# Patient Record
Sex: MALE | Race: WHITE | NOT HISPANIC OR LATINO | Employment: OTHER | URBAN - NONMETROPOLITAN AREA
[De-identification: names, ages, dates, MRNs, and addresses within clinical notes are randomized per-mention and may not be internally consistent; named-entity substitution may affect disease eponyms.]

---

## 2017-02-10 PROBLEM — G47.33 OBSTRUCTIVE SLEEP APNEA SYNDROME: Chronic | Status: ACTIVE | Noted: 2017-02-10

## 2017-02-10 PROBLEM — E78.5 DYSLIPIDEMIA: Chronic | Status: ACTIVE | Noted: 2017-02-10

## 2017-02-10 PROBLEM — D75.1 POLYCYTHEMIA, SECONDARY: Chronic | Status: ACTIVE | Noted: 2017-02-10

## 2017-02-10 PROBLEM — Z86.39 H/O HYPOTESTOSTERONEMIA: Chronic | Status: ACTIVE | Noted: 2017-02-10

## 2020-04-17 ENCOUNTER — HISTORICAL (OUTPATIENT)
Dept: ADMINISTRATIVE | Facility: HOSPITAL | Age: 55
End: 2020-04-17

## 2020-09-24 ENCOUNTER — LAB VISIT (OUTPATIENT)
Dept: LAB | Facility: HOSPITAL | Age: 55
End: 2020-09-24
Attending: PAIN MEDICINE
Payer: MEDICARE

## 2020-09-24 DIAGNOSIS — Z79.899 ENCOUNTER FOR LONG-TERM (CURRENT) USE OF OTHER MEDICATIONS: Primary | ICD-10-CM

## 2020-09-24 LAB
ALBUMIN SERPL BCP-MCNC: 3.7 G/DL (ref 3.5–5.2)
ALP SERPL-CCNC: 148 U/L (ref 55–135)
ALT SERPL W/O P-5'-P-CCNC: 17 U/L (ref 10–44)
ANION GAP SERPL CALC-SCNC: 5 MMOL/L (ref 8–16)
AST SERPL-CCNC: 15 U/L (ref 10–40)
BASOPHILS # BLD AUTO: 0.09 K/UL (ref 0–0.2)
BASOPHILS NFR BLD: 1.1 % (ref 0–1.9)
BILIRUB SERPL-MCNC: 0.4 MG/DL (ref 0.1–1)
BUN SERPL-MCNC: 10 MG/DL (ref 6–20)
CALCIUM SERPL-MCNC: 9.1 MG/DL (ref 8.7–10.5)
CHLORIDE SERPL-SCNC: 106 MMOL/L (ref 95–110)
CO2 SERPL-SCNC: 29 MMOL/L (ref 23–29)
CREAT SERPL-MCNC: 1 MG/DL (ref 0.5–1.4)
CRP SERPL-MCNC: 0.96 MG/DL (ref 0–0.75)
DIFFERENTIAL METHOD: ABNORMAL
EOSINOPHIL # BLD AUTO: 0.2 K/UL (ref 0–0.5)
EOSINOPHIL NFR BLD: 2.4 % (ref 0–8)
ERYTHROCYTE [DISTWIDTH] IN BLOOD BY AUTOMATED COUNT: 14.8 % (ref 11.5–14.5)
ERYTHROCYTE [SEDIMENTATION RATE] IN BLOOD: 0 MM/HR (ref 0–10)
EST. GFR  (AFRICAN AMERICAN): >60 ML/MIN/1.73 M^2
EST. GFR  (NON AFRICAN AMERICAN): >60 ML/MIN/1.73 M^2
GLUCOSE SERPL-MCNC: 94 MG/DL (ref 70–110)
HCT VFR BLD AUTO: 52.2 % (ref 40–54)
HGB BLD-MCNC: 16.8 G/DL (ref 14–18)
IMM GRANULOCYTES # BLD AUTO: 0.03 K/UL (ref 0–0.04)
IMM GRANULOCYTES NFR BLD AUTO: 0.4 % (ref 0–0.5)
LYMPHOCYTES # BLD AUTO: 1.6 K/UL (ref 1–4.8)
LYMPHOCYTES NFR BLD: 19.3 % (ref 18–48)
MCH RBC QN AUTO: 27.5 PG (ref 27–31)
MCHC RBC AUTO-ENTMCNC: 32.2 G/DL (ref 32–36)
MCV RBC AUTO: 85 FL (ref 82–98)
MONOCYTES # BLD AUTO: 0.7 K/UL (ref 0.3–1)
MONOCYTES NFR BLD: 8.4 % (ref 4–15)
NEUTROPHILS # BLD AUTO: 5.6 K/UL (ref 1.8–7.7)
NEUTROPHILS NFR BLD: 68.4 % (ref 38–73)
NRBC BLD-RTO: 0 /100 WBC
PLATELET # BLD AUTO: 238 K/UL (ref 150–350)
PMV BLD AUTO: 10.3 FL (ref 9.2–12.9)
POTASSIUM SERPL-SCNC: 3.3 MMOL/L (ref 3.5–5.1)
PROT SERPL-MCNC: 7.7 G/DL (ref 6–8.4)
RBC # BLD AUTO: 6.11 M/UL (ref 4.6–6.2)
SODIUM SERPL-SCNC: 140 MMOL/L (ref 136–145)
WBC # BLD AUTO: 8.22 K/UL (ref 3.9–12.7)

## 2020-09-24 PROCEDURE — 80053 COMPREHEN METABOLIC PANEL: CPT

## 2020-09-24 PROCEDURE — 85025 COMPLETE CBC W/AUTO DIFF WBC: CPT

## 2020-09-24 PROCEDURE — 36415 COLL VENOUS BLD VENIPUNCTURE: CPT

## 2020-09-24 PROCEDURE — 85651 RBC SED RATE NONAUTOMATED: CPT

## 2020-09-24 PROCEDURE — 86140 C-REACTIVE PROTEIN: CPT

## 2020-10-30 ENCOUNTER — LAB VISIT (OUTPATIENT)
Dept: LAB | Facility: HOSPITAL | Age: 55
End: 2020-10-30
Attending: PAIN MEDICINE
Payer: MEDICARE

## 2020-10-30 DIAGNOSIS — R79.9 ABNORMAL BLOOD CHEMISTRY: Primary | ICD-10-CM

## 2020-10-30 LAB
ALP SERPL-CCNC: 147 U/L (ref 55–135)
CRP SERPL-MCNC: 0.44 MG/DL (ref 0–0.75)
POTASSIUM SERPL-SCNC: 4.7 MMOL/L (ref 3.5–5.1)

## 2020-10-30 PROCEDURE — 36415 COLL VENOUS BLD VENIPUNCTURE: CPT

## 2020-10-30 PROCEDURE — 84132 ASSAY OF SERUM POTASSIUM: CPT

## 2020-10-30 PROCEDURE — 84075 ASSAY ALKALINE PHOSPHATASE: CPT

## 2020-10-30 PROCEDURE — 86140 C-REACTIVE PROTEIN: CPT

## 2020-11-30 ENCOUNTER — LAB VISIT (OUTPATIENT)
Dept: LAB | Facility: HOSPITAL | Age: 55
End: 2020-11-30
Attending: INTERNAL MEDICINE
Payer: MEDICARE

## 2020-11-30 DIAGNOSIS — E87.6 HYPOKALEMIA: Primary | ICD-10-CM

## 2020-11-30 LAB
ANION GAP SERPL CALC-SCNC: 9 MMOL/L (ref 8–16)
BUN SERPL-MCNC: 10 MG/DL (ref 6–20)
CALCIUM SERPL-MCNC: 9.3 MG/DL (ref 8.7–10.5)
CHLORIDE SERPL-SCNC: 104 MMOL/L (ref 95–110)
CO2 SERPL-SCNC: 30 MMOL/L (ref 23–29)
CREAT SERPL-MCNC: 1.3 MG/DL (ref 0.5–1.4)
EST. GFR  (AFRICAN AMERICAN): >60 ML/MIN/1.73 M^2
EST. GFR  (NON AFRICAN AMERICAN): >60 ML/MIN/1.73 M^2
GLUCOSE SERPL-MCNC: 114 MG/DL (ref 70–110)
POTASSIUM SERPL-SCNC: 4 MMOL/L (ref 3.5–5.1)
SODIUM SERPL-SCNC: 143 MMOL/L (ref 136–145)

## 2020-11-30 PROCEDURE — 80048 BASIC METABOLIC PNL TOTAL CA: CPT

## 2020-11-30 PROCEDURE — 36415 COLL VENOUS BLD VENIPUNCTURE: CPT

## 2020-12-08 ENCOUNTER — HOSPITAL ENCOUNTER (OUTPATIENT)
Dept: RADIOLOGY | Facility: HOSPITAL | Age: 55
Discharge: HOME | End: 2020-12-08
Attending: INTERNAL MEDICINE
Payer: MEDICARE

## 2020-12-08 DIAGNOSIS — R10.9 ABDOMINAL PAIN: ICD-10-CM

## 2020-12-08 DIAGNOSIS — R10.9 ABDOMINAL PAIN: Primary | ICD-10-CM

## 2020-12-08 PROCEDURE — 74018 RADEX ABDOMEN 1 VIEW: CPT | Mod: TC

## 2021-03-25 ENCOUNTER — IMMUNIZATION (OUTPATIENT)
Dept: OBSTETRICS AND GYNECOLOGY | Facility: CLINIC | Age: 56
End: 2021-03-25
Payer: MEDICARE

## 2021-03-25 DIAGNOSIS — Z23 NEED FOR VACCINATION: Primary | ICD-10-CM

## 2021-03-25 PROCEDURE — 0001A COVID-19, MRNA, LNP-S, PF, 30 MCG/0.3 ML DOSE VACCINE: ICD-10-PCS | Mod: CV19,,, | Performed by: ANESTHESIOLOGY

## 2021-03-25 PROCEDURE — 91300 COVID-19, MRNA, LNP-S, PF, 30 MCG/0.3 ML DOSE VACCINE: ICD-10-PCS | Mod: ,,, | Performed by: ANESTHESIOLOGY

## 2021-03-25 PROCEDURE — 0001A COVID-19, MRNA, LNP-S, PF, 30 MCG/0.3 ML DOSE VACCINE: CPT | Mod: CV19,,, | Performed by: ANESTHESIOLOGY

## 2021-03-25 PROCEDURE — 91300 COVID-19, MRNA, LNP-S, PF, 30 MCG/0.3 ML DOSE VACCINE: CPT | Mod: ,,, | Performed by: ANESTHESIOLOGY

## 2021-04-15 ENCOUNTER — IMMUNIZATION (OUTPATIENT)
Dept: OBSTETRICS AND GYNECOLOGY | Facility: CLINIC | Age: 56
End: 2021-04-15
Payer: MEDICARE

## 2021-04-15 DIAGNOSIS — Z23 NEED FOR VACCINATION: Primary | ICD-10-CM

## 2021-04-15 PROCEDURE — 0002A COVID-19, MRNA, LNP-S, PF, 30 MCG/0.3 ML DOSE VACCINE: ICD-10-PCS | Mod: CV19,,, | Performed by: ANESTHESIOLOGY

## 2021-04-15 PROCEDURE — 91300 COVID-19, MRNA, LNP-S, PF, 30 MCG/0.3 ML DOSE VACCINE: ICD-10-PCS | Mod: ,,, | Performed by: ANESTHESIOLOGY

## 2021-04-15 PROCEDURE — 91300 COVID-19, MRNA, LNP-S, PF, 30 MCG/0.3 ML DOSE VACCINE: CPT | Mod: ,,, | Performed by: ANESTHESIOLOGY

## 2021-04-15 PROCEDURE — 0002A COVID-19, MRNA, LNP-S, PF, 30 MCG/0.3 ML DOSE VACCINE: CPT | Mod: CV19,,, | Performed by: ANESTHESIOLOGY

## 2021-10-06 ENCOUNTER — LAB VISIT (OUTPATIENT)
Dept: LAB | Facility: HOSPITAL | Age: 56
End: 2021-10-06
Attending: INTERNAL MEDICINE
Payer: MEDICARE

## 2021-10-06 DIAGNOSIS — M19.90 OSTEOARTHRITIS: Primary | ICD-10-CM

## 2021-10-06 DIAGNOSIS — E78.5 HYPERLIPIDEMIA: ICD-10-CM

## 2021-10-06 DIAGNOSIS — M54.9 SEVERE BACK PAIN: ICD-10-CM

## 2021-10-06 LAB
ALBUMIN SERPL BCP-MCNC: 3.7 G/DL (ref 3.5–5.2)
ALP SERPL-CCNC: 142 U/L (ref 55–135)
ALT SERPL W/O P-5'-P-CCNC: 12 U/L (ref 10–44)
ANION GAP SERPL CALC-SCNC: 4 MMOL/L (ref 8–16)
AST SERPL-CCNC: 5 U/L (ref 10–40)
BASOPHILS # BLD AUTO: 0.1 K/UL (ref 0–0.2)
BASOPHILS NFR BLD: 1 % (ref 0–1.9)
BILIRUB SERPL-MCNC: 0.4 MG/DL (ref 0.1–1)
BUN SERPL-MCNC: 8 MG/DL (ref 6–20)
CALCIUM SERPL-MCNC: 9.1 MG/DL (ref 8.7–10.5)
CHLORIDE SERPL-SCNC: 105 MMOL/L (ref 95–110)
CHOLEST SERPL-MCNC: 98 MG/DL (ref 120–199)
CHOLEST/HDLC SERPL: 3.1 {RATIO} (ref 2–5)
CO2 SERPL-SCNC: 30 MMOL/L (ref 23–29)
COMPLEXED PSA SERPL-MCNC: 1.5 NG/ML (ref 0–4)
CREAT SERPL-MCNC: 1.2 MG/DL (ref 0.5–1.4)
DIFFERENTIAL METHOD: NORMAL
EOSINOPHIL # BLD AUTO: 0.2 K/UL (ref 0–0.5)
EOSINOPHIL NFR BLD: 2.3 % (ref 0–8)
ERYTHROCYTE [DISTWIDTH] IN BLOOD BY AUTOMATED COUNT: 14.4 % (ref 11.5–14.5)
EST. GFR  (AFRICAN AMERICAN): >60 ML/MIN/1.73 M^2
EST. GFR  (NON AFRICAN AMERICAN): >60 ML/MIN/1.73 M^2
ESTIMATED AVG GLUCOSE: 105 MG/DL (ref 68–131)
GLUCOSE SERPL-MCNC: 116 MG/DL (ref 70–110)
HBA1C MFR BLD: 5.3 % (ref 4–5.6)
HCT VFR BLD AUTO: 53.1 % (ref 40–54)
HDLC SERPL-MCNC: 32 MG/DL (ref 40–75)
HDLC SERPL: 32.7 % (ref 20–50)
HGB BLD-MCNC: 18 G/DL (ref 14–18)
IMM GRANULOCYTES # BLD AUTO: 0.03 K/UL (ref 0–0.04)
IMM GRANULOCYTES NFR BLD AUTO: 0.3 % (ref 0–0.5)
LDLC SERPL CALC-MCNC: 6.4 MG/DL (ref 63–159)
LYMPHOCYTES # BLD AUTO: 2.1 K/UL (ref 1–4.8)
LYMPHOCYTES NFR BLD: 22.3 % (ref 18–48)
MCH RBC QN AUTO: 30.3 PG (ref 27–31)
MCHC RBC AUTO-ENTMCNC: 33.9 G/DL (ref 32–36)
MCV RBC AUTO: 89 FL (ref 82–98)
MONOCYTES # BLD AUTO: 0.8 K/UL (ref 0.3–1)
MONOCYTES NFR BLD: 8.1 % (ref 4–15)
NEUTROPHILS # BLD AUTO: 6.3 K/UL (ref 1.8–7.7)
NEUTROPHILS NFR BLD: 66 % (ref 38–73)
NONHDLC SERPL-MCNC: 66 MG/DL
NRBC BLD-RTO: 0 /100 WBC
PLATELET # BLD AUTO: 265 K/UL (ref 150–450)
PMV BLD AUTO: 10.1 FL (ref 9.2–12.9)
POTASSIUM SERPL-SCNC: 3.4 MMOL/L (ref 3.5–5.1)
PROT SERPL-MCNC: 7.7 G/DL (ref 6–8.4)
RBC # BLD AUTO: 5.94 M/UL (ref 4.6–6.2)
SODIUM SERPL-SCNC: 139 MMOL/L (ref 136–145)
TRIGL SERPL-MCNC: 298 MG/DL (ref 30–150)
TSH SERPL DL<=0.005 MIU/L-ACNC: 3.78 UIU/ML (ref 0.4–4)
WBC # BLD AUTO: 9.61 K/UL (ref 3.9–12.7)

## 2021-10-06 PROCEDURE — 84153 ASSAY OF PSA TOTAL: CPT | Performed by: INTERNAL MEDICINE

## 2021-10-06 PROCEDURE — 36415 COLL VENOUS BLD VENIPUNCTURE: CPT | Performed by: INTERNAL MEDICINE

## 2021-10-06 PROCEDURE — 80061 LIPID PANEL: CPT | Performed by: INTERNAL MEDICINE

## 2021-10-06 PROCEDURE — 85025 COMPLETE CBC W/AUTO DIFF WBC: CPT | Performed by: INTERNAL MEDICINE

## 2021-10-06 PROCEDURE — 84443 ASSAY THYROID STIM HORMONE: CPT | Performed by: INTERNAL MEDICINE

## 2021-10-06 PROCEDURE — 83036 HEMOGLOBIN GLYCOSYLATED A1C: CPT | Performed by: INTERNAL MEDICINE

## 2021-10-06 PROCEDURE — 80053 COMPREHEN METABOLIC PANEL: CPT | Performed by: INTERNAL MEDICINE

## 2021-11-05 ENCOUNTER — LAB VISIT (OUTPATIENT)
Dept: LAB | Facility: HOSPITAL | Age: 56
End: 2021-11-05
Attending: INTERNAL MEDICINE
Payer: MEDICARE

## 2021-11-05 DIAGNOSIS — I10 ESSENTIAL HYPERTENSION, BENIGN: Primary | ICD-10-CM

## 2021-11-05 DIAGNOSIS — E78.5 DYSLIPIDEMIA: ICD-10-CM

## 2021-11-05 DIAGNOSIS — E03.9 HYPOTHYROIDISM, UNSPECIFIED TYPE: ICD-10-CM

## 2021-11-05 LAB
ALBUMIN SERPL BCP-MCNC: 4 G/DL (ref 3.5–5.2)
ALP SERPL-CCNC: 166 U/L (ref 55–135)
ALT SERPL W/O P-5'-P-CCNC: 23 U/L (ref 10–44)
ANION GAP SERPL CALC-SCNC: 7 MMOL/L (ref 8–16)
AST SERPL-CCNC: 12 U/L (ref 10–40)
BASOPHILS # BLD AUTO: 0.14 K/UL (ref 0–0.2)
BASOPHILS NFR BLD: 1.3 % (ref 0–1.9)
BILIRUB SERPL-MCNC: 0.9 MG/DL (ref 0.1–1)
BUN SERPL-MCNC: 10 MG/DL (ref 6–20)
CALCIUM SERPL-MCNC: 9.6 MG/DL (ref 8.7–10.5)
CHLORIDE SERPL-SCNC: 102 MMOL/L (ref 95–110)
CO2 SERPL-SCNC: 30 MMOL/L (ref 23–29)
CREAT SERPL-MCNC: 1.3 MG/DL (ref 0.5–1.4)
DIFFERENTIAL METHOD: ABNORMAL
EOSINOPHIL # BLD AUTO: 0.1 K/UL (ref 0–0.5)
EOSINOPHIL NFR BLD: 1.2 % (ref 0–8)
ERYTHROCYTE [DISTWIDTH] IN BLOOD BY AUTOMATED COUNT: 14.8 % (ref 11.5–14.5)
EST. GFR  (AFRICAN AMERICAN): >60 ML/MIN/1.73 M^2
EST. GFR  (NON AFRICAN AMERICAN): >60 ML/MIN/1.73 M^2
GLUCOSE SERPL-MCNC: 100 MG/DL (ref 70–110)
HCT VFR BLD AUTO: 58.3 % (ref 40–54)
HGB BLD-MCNC: 19.2 G/DL (ref 14–18)
IMM GRANULOCYTES # BLD AUTO: 0.06 K/UL (ref 0–0.04)
IMM GRANULOCYTES NFR BLD AUTO: 0.6 % (ref 0–0.5)
LYMPHOCYTES # BLD AUTO: 1.6 K/UL (ref 1–4.8)
LYMPHOCYTES NFR BLD: 15.4 % (ref 18–48)
MCH RBC QN AUTO: 30.1 PG (ref 27–31)
MCHC RBC AUTO-ENTMCNC: 32.9 G/DL (ref 32–36)
MCV RBC AUTO: 92 FL (ref 82–98)
MONOCYTES # BLD AUTO: 0.7 K/UL (ref 0.3–1)
MONOCYTES NFR BLD: 6.8 % (ref 4–15)
NEUTROPHILS # BLD AUTO: 8 K/UL (ref 1.8–7.7)
NEUTROPHILS NFR BLD: 74.7 % (ref 38–73)
NRBC BLD-RTO: 0 /100 WBC
PLATELET # BLD AUTO: 260 K/UL (ref 150–450)
PMV BLD AUTO: 9.5 FL (ref 9.2–12.9)
POTASSIUM SERPL-SCNC: 4.5 MMOL/L (ref 3.5–5.1)
PROT SERPL-MCNC: 8.6 G/DL (ref 6–8.4)
RBC # BLD AUTO: 6.37 M/UL (ref 4.6–6.2)
SODIUM SERPL-SCNC: 139 MMOL/L (ref 136–145)
TESTOST SERPL-MCNC: 277 NG/DL (ref 304–1227)
WBC # BLD AUTO: 10.64 K/UL (ref 3.9–12.7)

## 2021-11-05 PROCEDURE — 36415 COLL VENOUS BLD VENIPUNCTURE: CPT | Performed by: INTERNAL MEDICINE

## 2021-11-05 PROCEDURE — 80053 COMPREHEN METABOLIC PANEL: CPT | Performed by: INTERNAL MEDICINE

## 2021-11-05 PROCEDURE — 84403 ASSAY OF TOTAL TESTOSTERONE: CPT | Performed by: INTERNAL MEDICINE

## 2021-11-05 PROCEDURE — 85025 COMPLETE CBC W/AUTO DIFF WBC: CPT | Performed by: INTERNAL MEDICINE

## 2021-11-18 ENCOUNTER — LAB VISIT (OUTPATIENT)
Dept: LAB | Facility: HOSPITAL | Age: 56
End: 2021-11-18
Attending: INTERNAL MEDICINE
Payer: MEDICARE

## 2021-11-18 DIAGNOSIS — D75.1 POLYCYTHEMIA: Primary | ICD-10-CM

## 2021-11-18 LAB
BASOPHILS # BLD AUTO: 0.13 K/UL (ref 0–0.2)
BASOPHILS NFR BLD: 1.3 % (ref 0–1.9)
DIFFERENTIAL METHOD: ABNORMAL
EOSINOPHIL # BLD AUTO: 0.2 K/UL (ref 0–0.5)
EOSINOPHIL NFR BLD: 1.5 % (ref 0–8)
ERYTHROCYTE [DISTWIDTH] IN BLOOD BY AUTOMATED COUNT: 13.3 % (ref 11.5–14.5)
HCT VFR BLD AUTO: 50.6 % (ref 40–54)
HGB BLD-MCNC: 17 G/DL (ref 14–18)
IMM GRANULOCYTES # BLD AUTO: 0.06 K/UL (ref 0–0.04)
IMM GRANULOCYTES NFR BLD AUTO: 0.6 % (ref 0–0.5)
LYMPHOCYTES # BLD AUTO: 1.9 K/UL (ref 1–4.8)
LYMPHOCYTES NFR BLD: 18.9 % (ref 18–48)
MCH RBC QN AUTO: 30.5 PG (ref 27–31)
MCHC RBC AUTO-ENTMCNC: 33.6 G/DL (ref 32–36)
MCV RBC AUTO: 91 FL (ref 82–98)
MONOCYTES # BLD AUTO: 0.7 K/UL (ref 0.3–1)
MONOCYTES NFR BLD: 6.7 % (ref 4–15)
NEUTROPHILS # BLD AUTO: 7.1 K/UL (ref 1.8–7.7)
NEUTROPHILS NFR BLD: 71 % (ref 38–73)
NRBC BLD-RTO: 0 /100 WBC
PLATELET # BLD AUTO: 259 K/UL (ref 150–450)
PMV BLD AUTO: 9.6 FL (ref 9.2–12.9)
RBC # BLD AUTO: 5.57 M/UL (ref 4.6–6.2)
WBC # BLD AUTO: 9.99 K/UL (ref 3.9–12.7)

## 2021-11-18 PROCEDURE — 36415 COLL VENOUS BLD VENIPUNCTURE: CPT | Performed by: INTERNAL MEDICINE

## 2021-11-18 PROCEDURE — 85025 COMPLETE CBC W/AUTO DIFF WBC: CPT | Performed by: INTERNAL MEDICINE

## 2021-12-30 ENCOUNTER — HOSPITAL ENCOUNTER (EMERGENCY)
Facility: HOSPITAL | Age: 56
Discharge: HOME | End: 2021-12-30
Attending: EMERGENCY MEDICINE
Payer: MEDICARE

## 2021-12-30 VITALS
BODY MASS INDEX: 29.68 KG/M2 | WEIGHT: 212 LBS | OXYGEN SATURATION: 99 % | HEIGHT: 71 IN | RESPIRATION RATE: 18 BRPM | SYSTOLIC BLOOD PRESSURE: 144 MMHG | HEART RATE: 92 BPM | TEMPERATURE: 98 F | DIASTOLIC BLOOD PRESSURE: 102 MMHG

## 2021-12-30 DIAGNOSIS — M16.11 OSTEOARTHRITIS OF RIGHT HIP, UNSPECIFIED OSTEOARTHRITIS TYPE: ICD-10-CM

## 2021-12-30 DIAGNOSIS — M25.551 RIGHT HIP PAIN: Primary | ICD-10-CM

## 2021-12-30 DIAGNOSIS — R52 PAIN: ICD-10-CM

## 2021-12-30 PROCEDURE — 99284 EMERGENCY DEPT VISIT MOD MDM: CPT | Mod: 25

## 2021-12-30 PROCEDURE — 25000003 PHARM REV CODE 250: Performed by: EMERGENCY MEDICINE

## 2021-12-30 PROCEDURE — 96372 THER/PROPH/DIAG INJ SC/IM: CPT

## 2021-12-30 PROCEDURE — 63600175 PHARM REV CODE 636 W HCPCS: Performed by: EMERGENCY MEDICINE

## 2021-12-30 RX ORDER — METHYLPREDNISOLONE ACETATE 80 MG/ML
120 INJECTION, SUSPENSION INTRA-ARTICULAR; INTRALESIONAL; INTRAMUSCULAR; SOFT TISSUE
Status: COMPLETED | OUTPATIENT
Start: 2021-12-30 | End: 2021-12-30

## 2021-12-30 RX ORDER — HYDROMORPHONE HYDROCHLORIDE 1 MG/ML
1 INJECTION, SOLUTION INTRAMUSCULAR; INTRAVENOUS; SUBCUTANEOUS
Status: COMPLETED | OUTPATIENT
Start: 2021-12-30 | End: 2021-12-30

## 2021-12-30 RX ORDER — ONDANSETRON 4 MG/1
8 TABLET, ORALLY DISINTEGRATING ORAL
Status: COMPLETED | OUTPATIENT
Start: 2021-12-30 | End: 2021-12-30

## 2021-12-30 RX ADMIN — ONDANSETRON 8 MG: 4 TABLET, ORALLY DISINTEGRATING ORAL at 10:12

## 2021-12-30 RX ADMIN — HYDROMORPHONE HYDROCHLORIDE 1 MG: 1 INJECTION, SOLUTION INTRAMUSCULAR; INTRAVENOUS; SUBCUTANEOUS at 10:12

## 2021-12-30 RX ADMIN — METHYLPREDNISOLONE ACETATE 120 MG: 80 INJECTION, SUSPENSION INTRA-ARTICULAR; INTRALESIONAL; INTRAMUSCULAR; SOFT TISSUE at 10:12

## 2021-12-30 NOTE — ED PROVIDER NOTES
Encounter Date: 12/30/2021       History     Chief Complaint   Patient presents with    Hip Pain     Patient to the ER with complaints of right hip pain onset one week ago     56-year-old male complaining of atraumatic right hip pain progressively getting worse over the past week.  Denies any trauma whatsoever.  Patient believes it is  his sciatic nerve.  Denies any weakness.  Does have a slight limp when he walks.  Denies fever.  Denies rash.  He is not ill appearing, alert oriented x4, GCS is 15.         Review of patient's allergies indicates:  No Known Allergies  Past Medical History:   Diagnosis Date    Arthritis     Depression     Dyslipidemia 2/10/2017    GERD (gastroesophageal reflux disease)     H/O hypotestosteronemia 2/10/2017    Obstructive sleep apnea syndrome 2/10/2017    Polycythemia, secondary 2/10/2017     Past Surgical History:   Procedure Laterality Date    BACK SURGERY      KNEE SURGERY      SHOULDER SURGERY       Family History   Problem Relation Age of Onset    Breast cancer Mother     Diabetes Mother     Coronary artery disease Mother     Hypertension Mother     Lung cancer Father      Social History     Tobacco Use    Smoking status: Never Smoker     Review of Systems   Constitutional: Negative for fever.   HENT: Negative for sore throat.    Respiratory: Negative for shortness of breath.    Cardiovascular: Negative for chest pain.   Gastrointestinal: Negative for nausea.   Genitourinary: Negative for dysuria.   Musculoskeletal: Positive for arthralgias. Negative for back pain.   Skin: Negative for rash.   Neurological: Negative for weakness.   Hematological: Does not bruise/bleed easily.   All other systems reviewed and are negative.      Physical Exam     Initial Vitals [12/30/21 1004]   BP Pulse Resp Temp SpO2   (!) 144/102 92 18 97.8 °F (36.6 °C) 99 %      MAP       --         Physical Exam    Nursing note and vitals reviewed.  Constitutional: He appears well-developed and  well-nourished. He is not diaphoretic. No distress.   HENT:   Head: Normocephalic and atraumatic.   Eyes: Conjunctivae and EOM are normal. Pupils are equal, round, and reactive to light. Right eye exhibits no discharge. Left eye exhibits no discharge. No scleral icterus.   Neck: Neck supple. No JVD present.   Normal range of motion.  Cardiovascular: Normal rate, regular rhythm, normal heart sounds and intact distal pulses.   No murmur heard.  Pulmonary/Chest: Breath sounds normal. No stridor. No respiratory distress. He has no wheezes. He has no rhonchi. He has no rales. He exhibits no tenderness.   Abdominal: Abdomen is soft. Bowel sounds are normal. He exhibits no distension and no mass. There is no abdominal tenderness. There is no rebound and no guarding.   Musculoskeletal:         General: Tenderness present. No edema. Normal range of motion.      Cervical back: Normal range of motion and neck supple.      Comments: Tenderness right hip with palpation, no rash, no erythema, neurovascularly intact, strong distal pulses, no cyanosis     Neurological: He is alert and oriented to person, place, and time. He has normal strength. GCS score is 15. GCS eye subscore is 4. GCS verbal subscore is 5. GCS motor subscore is 6.   Skin: Skin is warm and dry. Capillary refill takes less than 2 seconds.         ED Course   Procedures  Labs Reviewed - No data to display       Imaging Results          X-Ray Hip 2 or 3 views Right (with Pelvis when performed) (In process)                  Medications   HYDROmorphone injection 1 mg (1 mg Intramuscular Given 12/30/21 1025)   ondansetron disintegrating tablet 8 mg (8 mg Oral Given 12/30/21 1024)   methylPREDNISolone acetate injection 120 mg (120 mg Intramuscular Given 12/30/21 1024)     Medical Decision Making:   Differential Diagnosis:   Bursitis, sciatic nerve issues, arthritis, osteoarthritis             ED Course as of 12/30/21 1053   Thu Dec 30, 2021   1051 Right hip x-ray with  what appears to be osteoarthritic changes [SD]      ED Course User Index  [SD] Abdirahman Harman MD             Clinical Impression:   Final diagnoses:  [R52] Pain  [M25.551] Right hip pain (Primary)  [M16.11] Osteoarthritis of right hip, unspecified osteoarthritis type          ED Disposition Condition    Discharge Stable        ED Prescriptions     None        Follow-up Information     Follow up With Specialties Details Why Contact Info    Primary care physician  In 2 days             Abdirahman Harman MD  12/30/21 9798

## 2022-09-13 ENCOUNTER — LAB VISIT (OUTPATIENT)
Dept: LAB | Facility: HOSPITAL | Age: 57
End: 2022-09-13
Attending: INTERNAL MEDICINE
Payer: MEDICARE

## 2022-09-13 DIAGNOSIS — E78.5 HYPERLIPIDEMIA, UNSPECIFIED HYPERLIPIDEMIA TYPE: ICD-10-CM

## 2022-09-13 DIAGNOSIS — I10 ESSENTIAL HYPERTENSION, MALIGNANT: Primary | ICD-10-CM

## 2022-09-13 DIAGNOSIS — G47.00 INSOMNIA, UNSPECIFIED TYPE: ICD-10-CM

## 2022-09-13 DIAGNOSIS — G25.81 RLS (RESTLESS LEGS SYNDROME): ICD-10-CM

## 2022-09-13 LAB
ALBUMIN SERPL BCP-MCNC: 3.9 G/DL (ref 3.5–5.2)
ALP SERPL-CCNC: 168 U/L (ref 55–135)
ALT SERPL W/O P-5'-P-CCNC: 15 U/L (ref 10–44)
ANION GAP SERPL CALC-SCNC: 4 MMOL/L (ref 8–16)
AST SERPL-CCNC: 10 U/L (ref 10–40)
BASOPHILS # BLD AUTO: 0.08 K/UL (ref 0–0.2)
BASOPHILS NFR BLD: 1 % (ref 0–1.9)
BILIRUB SERPL-MCNC: 0.6 MG/DL (ref 0.1–1)
BILIRUB UR QL STRIP: NEGATIVE
BUN SERPL-MCNC: 17 MG/DL (ref 6–20)
CALCIUM SERPL-MCNC: 9.2 MG/DL (ref 8.7–10.5)
CHLORIDE SERPL-SCNC: 105 MMOL/L (ref 95–110)
CHOLEST SERPL-MCNC: 174 MG/DL (ref 120–199)
CHOLEST/HDLC SERPL: 4.2 {RATIO} (ref 2–5)
CLARITY UR: CLEAR
CO2 SERPL-SCNC: 29 MMOL/L (ref 23–29)
COLOR UR: YELLOW
CREAT SERPL-MCNC: 1.3 MG/DL (ref 0.5–1.4)
DIFFERENTIAL METHOD: NORMAL
EOSINOPHIL # BLD AUTO: 0.2 K/UL (ref 0–0.5)
EOSINOPHIL NFR BLD: 2.1 % (ref 0–8)
ERYTHROCYTE [DISTWIDTH] IN BLOOD BY AUTOMATED COUNT: 13.8 % (ref 11.5–14.5)
EST. GFR  (NO RACE VARIABLE): >60 ML/MIN/1.73 M^2
GLUCOSE SERPL-MCNC: 119 MG/DL (ref 70–110)
GLUCOSE UR QL STRIP: NEGATIVE
HCT VFR BLD AUTO: 50.1 % (ref 40–54)
HDLC SERPL-MCNC: 41 MG/DL (ref 40–75)
HDLC SERPL: 23.6 % (ref 20–50)
HGB BLD-MCNC: 17.3 G/DL (ref 14–18)
HGB UR QL STRIP: NEGATIVE
IMM GRANULOCYTES # BLD AUTO: 0.02 K/UL (ref 0–0.04)
IMM GRANULOCYTES NFR BLD AUTO: 0.3 % (ref 0–0.5)
KETONES UR QL STRIP: NEGATIVE
LDLC SERPL CALC-MCNC: 61.2 MG/DL (ref 63–159)
LEUKOCYTE ESTERASE UR QL STRIP: NEGATIVE
LYMPHOCYTES # BLD AUTO: 2.2 K/UL (ref 1–4.8)
LYMPHOCYTES NFR BLD: 28.1 % (ref 18–48)
MCH RBC QN AUTO: 29.7 PG (ref 27–31)
MCHC RBC AUTO-ENTMCNC: 34.5 G/DL (ref 32–36)
MCV RBC AUTO: 86 FL (ref 82–98)
MONOCYTES # BLD AUTO: 0.7 K/UL (ref 0.3–1)
MONOCYTES NFR BLD: 8.2 % (ref 4–15)
NEUTROPHILS # BLD AUTO: 4.8 K/UL (ref 1.8–7.7)
NEUTROPHILS NFR BLD: 60.3 % (ref 38–73)
NITRITE UR QL STRIP: NEGATIVE
NONHDLC SERPL-MCNC: 133 MG/DL
NRBC BLD-RTO: 0 /100 WBC
PH UR STRIP: 6 [PH] (ref 5–8)
PLATELET # BLD AUTO: 263 K/UL (ref 150–450)
PMV BLD AUTO: 9.7 FL (ref 9.2–12.9)
POTASSIUM SERPL-SCNC: 3.6 MMOL/L (ref 3.5–5.1)
PROGEST SERPL-MCNC: 0.2 NG/ML
PROT SERPL-MCNC: 8 G/DL (ref 6–8.4)
PROT UR QL STRIP: NEGATIVE
RBC # BLD AUTO: 5.82 M/UL (ref 4.6–6.2)
SODIUM SERPL-SCNC: 138 MMOL/L (ref 136–145)
SP GR UR STRIP: >=1.03 (ref 1–1.03)
TRIGL SERPL-MCNC: 359 MG/DL (ref 30–150)
TSH SERPL DL<=0.005 MIU/L-ACNC: 2.4 UIU/ML (ref 0.4–4)
URN SPEC COLLECT METH UR: ABNORMAL
UROBILINOGEN UR STRIP-ACNC: 1 EU/DL
WBC # BLD AUTO: 7.91 K/UL (ref 3.9–12.7)

## 2022-09-13 PROCEDURE — 80061 LIPID PANEL: CPT | Performed by: INTERNAL MEDICINE

## 2022-09-13 PROCEDURE — 84144 ASSAY OF PROGESTERONE: CPT | Performed by: INTERNAL MEDICINE

## 2022-09-13 PROCEDURE — 81003 URINALYSIS AUTO W/O SCOPE: CPT | Performed by: INTERNAL MEDICINE

## 2022-09-13 PROCEDURE — 84443 ASSAY THYROID STIM HORMONE: CPT | Performed by: INTERNAL MEDICINE

## 2022-09-13 PROCEDURE — 36415 COLL VENOUS BLD VENIPUNCTURE: CPT | Performed by: INTERNAL MEDICINE

## 2022-09-13 PROCEDURE — 80053 COMPREHEN METABOLIC PANEL: CPT | Performed by: INTERNAL MEDICINE

## 2022-09-13 PROCEDURE — 85025 COMPLETE CBC W/AUTO DIFF WBC: CPT | Performed by: INTERNAL MEDICINE

## 2024-10-08 ENCOUNTER — HOSPITAL ENCOUNTER (EMERGENCY)
Facility: HOSPITAL | Age: 59
Discharge: HOME | End: 2024-10-08
Attending: EMERGENCY MEDICINE
Payer: MEDICAID

## 2024-10-08 VITALS
HEIGHT: 71 IN | BODY MASS INDEX: 29.54 KG/M2 | TEMPERATURE: 98 F | WEIGHT: 211 LBS | SYSTOLIC BLOOD PRESSURE: 139 MMHG | RESPIRATION RATE: 18 BRPM | OXYGEN SATURATION: 97 % | DIASTOLIC BLOOD PRESSURE: 87 MMHG | HEART RATE: 83 BPM

## 2024-10-08 DIAGNOSIS — S61.412A LACERATION OF LEFT HAND WITHOUT FOREIGN BODY, INITIAL ENCOUNTER: Primary | ICD-10-CM

## 2024-10-08 PROCEDURE — 63600175 PHARM REV CODE 636 W HCPCS: Performed by: NURSE PRACTITIONER

## 2024-10-08 PROCEDURE — 25000003 PHARM REV CODE 250: Performed by: NURSE PRACTITIONER

## 2024-10-08 PROCEDURE — 99283 EMERGENCY DEPT VISIT LOW MDM: CPT | Mod: 25

## 2024-10-08 PROCEDURE — 12002 RPR S/N/AX/GEN/TRNK2.6-7.5CM: CPT

## 2024-10-08 RX ORDER — MUPIROCIN 20 MG/G
1 OINTMENT TOPICAL
Status: COMPLETED | OUTPATIENT
Start: 2024-10-08 | End: 2024-10-08

## 2024-10-08 RX ORDER — LIDOCAINE HYDROCHLORIDE 10 MG/ML
10 INJECTION, SOLUTION INFILTRATION; PERINEURAL
Status: COMPLETED | OUTPATIENT
Start: 2024-10-08 | End: 2024-10-08

## 2024-10-08 RX ORDER — MUPIROCIN 20 MG/G
OINTMENT TOPICAL 2 TIMES DAILY
Qty: 22 G | Refills: 0 | Status: SHIPPED | OUTPATIENT
Start: 2024-10-08 | End: 2024-10-18

## 2024-10-08 RX ADMIN — MUPIROCIN 1 TUBE: 20 OINTMENT TOPICAL at 06:10

## 2024-10-08 RX ADMIN — LIDOCAINE HYDROCHLORIDE 10 ML: 10 INJECTION, SOLUTION INFILTRATION; PERINEURAL at 05:10

## 2024-10-08 NOTE — DISCHARGE INSTRUCTIONS
Wash wound twice daily with antibacterial soap and water and dry thoroughly.  Apply antibiotic ointment as directed.  Keep wound clean and dry.  However when outside of the home.  Follow-up with your primary doctor or return to the emergency department in 10 days for suture removal.

## 2024-10-08 NOTE — ED PROVIDER NOTES
Encounter Date: 10/8/2024       History     Chief Complaint   Patient presents with    Hand Injury     Patient reports laceration to the left palm from cutting it with a razor blade. Tetanus up to date per patient.      This is a 59-year-old white male with noncontributory past medical history who presents to the emergency department with complaints laceration to left hand that occurred just prior to arrival.  He reports accidentally falling on razor blade sustaining laceration to left palm with bleeding controlled prior to arrival.  He denies difficulty with movement or any other injury at this time.  Up-to-date on tetanus immunization.      Review of patient's allergies indicates:  No Known Allergies  Past Medical History:   Diagnosis Date    Arthritis     Depression     Dyslipidemia 2/10/2017    GERD (gastroesophageal reflux disease)     H/O hypotestosteronemia 2/10/2017    Obstructive sleep apnea syndrome 2/10/2017    Polycythemia, secondary 2/10/2017     Past Surgical History:   Procedure Laterality Date    BACK SURGERY      KNEE SURGERY      SHOULDER SURGERY       Family History   Problem Relation Name Age of Onset    Breast cancer Mother      Diabetes Mother      Coronary artery disease Mother      Hypertension Mother      Lung cancer Father       Social History     Tobacco Use    Smoking status: Never     Review of Systems   Skin:  Positive for wound.       Physical Exam     Initial Vitals [10/08/24 1707]   BP Pulse Resp Temp SpO2   (!) 130/101 94 20 98 °F (36.7 °C) 95 %      MAP       --         Physical Exam    Nursing note and vitals reviewed.  Constitutional: He appears well-developed and well-nourished. He is active. No distress.   HENT:   Head: Normocephalic and atraumatic.   Eyes: EOM are normal. Pupils are equal, round, and reactive to light.   Neck: Neck supple.   Normal range of motion.  Cardiovascular:  Normal rate.           Pulmonary/Chest: No respiratory distress.   Musculoskeletal:         Hands:       Cervical back: Normal range of motion and neck supple.     Neurological: He is alert and oriented to person, place, and time. GCS score is 15. GCS eye subscore is 4. GCS verbal subscore is 5. GCS motor subscore is 6.   Skin: Skin is warm and dry. Capillary refill takes less than 2 seconds.   Psychiatric: He has a normal mood and affect. His behavior is normal. Thought content normal.         ED Course   Lac Repair    Date/Time: 10/8/2024 5:51 PM    Performed by: Kat Matos NP  Authorized by: Abdirahman Harman MD    Consent:     Consent obtained:  Verbal    Risks discussed:  Infection, need for additional repair and poor cosmetic result  Anesthesia:     Anesthesia method:  Local infiltration    Local anesthetic:  Lidocaine 1% w/o epi  Laceration details:     Location:  Hand    Hand location:  L palm    Length (cm):  5  Pre-procedure details:     Preparation:  Patient was prepped and draped in usual sterile fashion  Exploration:     Hemostasis achieved with:  Direct pressure    Imaging outcome: foreign body not noted      Wound exploration: wound explored through full range of motion      Wound extent: no foreign bodies/material noted and no tendon damage noted      Contaminated: no    Treatment:     Area cleansed with:  Povidone-iodine and saline    Amount of cleaning:  Extensive    Irrigation solution:  Sterile saline    Irrigation volume:  1000    Irrigation method:  Pressure wash  Skin repair:     Repair method:  Sutures    Suture size:  4-0    Suture material:  Nylon    Suture technique:  Simple interrupted    Number of sutures:  4  Approximation:     Approximation:  Close  Repair type:     Repair type:  Simple  Post-procedure details:     Dressing:  Antibiotic ointment and non-adherent dressing    Procedure completion:  Tolerated well, no immediate complications    Labs Reviewed - No data to display       Imaging Results    None          Medications   mupirocin 2 % ointment 1 Tube (has no  administration in time range)   LIDOcaine HCL 10 mg/ml (1%) injection 10 mL (10 mLs Other Given by Provider 10/8/24 7063)     Medical Decision Making                                    Clinical Impression:  Final diagnoses:  [S61.412A] Laceration of left hand without foreign body, initial encounter (Primary)          ED Disposition Condition    Discharge Stable          ED Prescriptions       Medication Sig Dispense Start Date End Date Auth. Provider    mupirocin (BACTROBAN) 2 % ointment Apply topically 2 (two) times daily. for 10 days 22 g 10/8/2024 10/18/2024 Kat Matos NP          Follow-up Information       Follow up With Specialties Details Why Contact Info    PCP Follow UP  Call in 10 days for follow-up, For suture removal              Kat Matos NP  10/08/24 4306

## 2024-11-21 DIAGNOSIS — Z96.651 PRESENCE OF RIGHT ARTIFICIAL KNEE JOINT: Primary | ICD-10-CM

## 2024-11-21 DIAGNOSIS — Z47.1 AFTERCARE FOLLOWING JOINT REPLACEMENT: ICD-10-CM

## 2024-12-13 ENCOUNTER — CLINICAL SUPPORT (OUTPATIENT)
Facility: HOSPITAL | Age: 59
End: 2024-12-13
Payer: MEDICARE

## 2024-12-13 DIAGNOSIS — Z96.651 AFTERCARE FOLLOWING RIGHT KNEE JOINT REPLACEMENT SURGERY: Primary | ICD-10-CM

## 2024-12-13 DIAGNOSIS — Z47.1 AFTERCARE FOLLOWING JOINT REPLACEMENT: ICD-10-CM

## 2024-12-13 DIAGNOSIS — Z96.651 PRESENCE OF RIGHT ARTIFICIAL KNEE JOINT: ICD-10-CM

## 2024-12-13 DIAGNOSIS — Z47.1 AFTERCARE FOLLOWING RIGHT KNEE JOINT REPLACEMENT SURGERY: Primary | ICD-10-CM

## 2024-12-13 PROCEDURE — 97110 THERAPEUTIC EXERCISES: CPT

## 2024-12-13 PROCEDURE — 97161 PT EVAL LOW COMPLEX 20 MIN: CPT

## 2024-12-13 NOTE — PLAN OF CARE
Physical Therapy Initial Evaluation     Name: Yoseph HINES [facility]  Clinic Number: [id number]    Yoseph is a 59 y.o. male evaluated on 12/13/2024.     Diagnosis:   Encounter Diagnoses   Name Primary?    Presence of right artificial knee joint     Aftercare following joint replacement      Physician: Fany Hairston PA*  Treatment Orders: PT Eval and Treat    Past Medical History:   Diagnosis Date    Arthritis     Depression     Dyslipidemia 2/10/2017    GERD (gastroesophageal reflux disease)     H/O hypotestosteronemia 2/10/2017    Obstructive sleep apnea syndrome 2/10/2017    Polycythemia, secondary 2/10/2017     Current Outpatient Medications   Medication Sig    albuterol 90 mcg/actuation inhaler Inhale 2 puffs into the lungs every 6 (six) hours as needed for Wheezing. Rescue    amitriptyline (ELAVIL) 50 MG tablet Take 50 mg by mouth.    butalbital-acetaminophen-caffeine -40 mg (FIORICET, ESGIC) -40 mg per tablet Take 1 tablet by mouth every 6 (six) hours as needed for Pain or Headaches.    clonazePAM (KLONOPIN) 0.5 MG tablet Take 0.25 mg by mouth 2 (two) times daily as needed for Anxiety.    ezetimibe (ZETIA) 10 mg tablet Take 10 mg by mouth once daily.    fluticasone (FLONASE) 50 mcg/actuation nasal spray     hydrocodone-acetaminophen 10-325mg (NORCO)  mg Tab Take 1 tablet by mouth every 12 (twelve) hours as needed for Pain.     metoclopramide HCl (REGLAN) 10 MG tablet Take 1 tablet (10 mg total) by mouth every 6 (six) hours as needed.    nitroGLYCERIN (NITROSTAT) 0.3 MG SL tablet Place 0.3 mg under the tongue every 5 (five) minutes as needed for Chest pain.    pramipexole (MIRAPEX) 0.5 MG tablet Take 0.5 mg by mouth every evening.     quetiapine (SEROQUEL) 200 MG Tab Take 200 mg by mouth once daily.    testosterone cypionate (DEPOTESTOTERONE CYPIONATE) 200 mg/mL injection Inject 200 mg into the muscle every 14 (fourteen) days.    valsartan  "(DIOVAN) 80 MG tablet Take 80 mg by mouth once daily.    venlafaxine (EFFEXOR-XR) 150 MG Cp24 Take 150 mg by mouth once daily.      No current facility-administered medications for this visit.     Review of patient's allergies indicates:  No Known Allergies  Precautions: history of infection complication s/p TKA    Subjective     Patient States: (right) TKA on November 11, 2024 and then was hospitalized a few days later for approximately 1 week due to infection and to receive IV antibiotics. Patient did have home health physical therapy but said that the therapist really didn't do anything but talk. Patient continues to report (right) knee pain rated 8/10 at time of evaluation generalized and describes sensation as "feels like someone sticking needles/knife in me." Patient reports he uses crutches to navigate his steps. Patient was using rolling walker for ambulation up to 3 days ago. Patient lives with his wife and son. Patient has 2 steps with no handrail. Patient reports icing knee 2-3x per day. Patient reports not performing any exercises at home.     Patient Goals: ambulate without pain, ambulate without an assistive device, learn HEP, and return to independent PLOF.       Objective     FOTO: 31 (see media for complete details)  5x STS: 8.12s  30s STS: 13 reps    Observation: Patient ambulated into clinic using no AD with crocs on. Patient with antalgic gait pattern. Observed knee incision and no signs/symptoms of infection with incision. Incision appears healed.     Posture: knee flexion in standing position    Range of Motion: Knee   Left Right: A(P)   Flexion: 140 105 (110)   Extension 0 Lacking 8     Strength: Knee   Left Right   Quadriceps 5/5 3-/5   Hamstrings 5/5 3-/5     Lower Extremity Strength  Right LE  Left LE    Knee extension: 3-/5 Knee extension: 5/5   Knee flexion: 3-/5 Knee flexion: 5/5   Hip flexion: 4/5 Hip flexion: 5/5   Hip extension:  4/5 Hip extension: 5/5   Hip abduction: 4/5 Hip " abduction: 5/5   Hip adduction: 4/5 Hip adduction 5/5   Ankle dorsiflexion: 4+/5 Ankle dorsiflexion: 5/5   Ankle plantarflexion: 4+/5 Ankle plantarflexion: 5/5       Joint Mobility: hypomobile (right) knee  Palpation: tenderness to palpation generalized (right) knee  Sensation: intact to light touch    Edema:  Left: absent  Right: minimal    Girth   Left Right   Joint Line 42 cm 44 cm       Gait: Rainer ambulated with none.  Level of Assistance: independent  Patient displays decreased step length, increased base of support, decreased weight shift, antalgic gait, and decreased heel strike .     TREATMENT     Time In: 1300  Time Out: 1340    PT Evaluation Completed? Yes  Discussed Plan of Care with patient: Yes    Yoseph received 15 minutes of therapeutic exercise including:   Development, demonstration and performance of home exercise program including QS, SLR, supine hip abduction, heel slides, LAQ, bridging    Yoseph received 8 minutes of manual therapy including:  Patella mobility grade 2.   Scar massage to impact mobility of (right) knee incision.     Written Home Exercises Provided: provided and will be amended as appropriate.   Yoseph demonstrated good understanding of the education provided. Patient demonstrated good return demonstration of skill of exercises.    ASSESSMENT   Pt prognosis is Good.  Pt will benefit from skilled outpatient physical therapy to address the above stated deficits, provide pt/family education and to maximize pt's level of independence.     Medical necessity is demonstrated by the following IMPAIRMENTS/PROBLEMS:  1. Decreased (right) knee ROM  2. Increased (right) knee pain  3. Decreased strength (right) knee  4. Decreased tolerance functional activity  5. Increased gait impairments      Pt's spiritual, cultural and educational needs considered and pt agreeable to plan of care and goals as stated below:     Anticipated Barriers for physical therapy:     Short Term GOALS: 3 weeks. Pt  agrees with goals set.  1. Patient demonstrates independence with HEP.   2. Patient demonstrates postural awareness with all activities.   3. Patient reports decreased frequency, intensity and duration of pain symptoms.   4. Patient able to perform reciprocal stepping on steps.     Long Term GOALS: 6  weeks. Pt agrees with goals set.  1. Patient demonstrates improvement in FOTO score to 62 or greater to impact functional activity tolerance.   2. Patient demonstrates improvement in (right) knee range of motion 0-130 degrees to impact functional activities.   3. Patient demonstrates ambulation with fewer gait deviations.   4. Patient demonstrates decreased girth measurement (right) knee within .5 cm of (left) knee.   5. Patient demonstrates improvement in (right) LE strength by at least 1/2 grade or greater.   6. Goals PRN.     PLAN     Outpatient physical therapy 3 times weekly to include: pt ed, hep, therapeutic exercises, neuromuscular re-education/ balance exercises, manual therapy and modalities prn. Cont PT for  6 weeks. Pt may be seen by PTA as part of the rehabilitation team.   Certification dates: 12/13/2024-1/24/2025      Therapist: Mary Cr, PT    I certify the need for these services furnished under this plan of treatment and while under my care.  ____________________________________ Physician/Referring Practitioner   Date of Signature

## 2024-12-13 NOTE — PROGRESS NOTES
HOME EXERCISE PROGRAM  Created by Mary Cr  Dec 13th, 2024  View videos at [email or web address]        STRAIGHT LEG RAISE - SLR    While lying on your back, raise up your leg with a straight knee. Keep the opposite knee bent with the foot planted on the ground.    Video # [id number] Repeat 10 Times   Hold 1 Second   Complete 3 Sets   Perform 2 Times a Day          LONG ARC QUAD - LAQ - KNEE EXTENSION    Start in a seated position with your knee bent as shown, slowly straighten your knee as you raise your foot upwards as shown. Return to starting position and repeat.    Video # [id number] Repeat 10 Times   Hold 1 Second   Complete 3 Sets   Perform 2 Times a Day          HEEL SLIDES - SUPINE    Lying on your back with knees straight, slide the affected heel towards your buttock as you bend your knee.    Hold a gentle stretch in this position and then return to original position.    Video # [id number] Repeat 10 Times   Hold 5 Seconds   Complete 3 Sets   Perform 2 Times a Day          QUAD SET - TOWEL UNDER KNEE    Tighten your top thigh muscle as you attempt to press the back of your knee downward towards the table. Repeat 10 Times   Hold 5 Seconds   Complete 3 Sets   Perform 2 Times a Day          HIP ABDUCTION - SUPINE    While lying on your back, slowly bring your leg out to the side. Keep your knee straight the entire time.    Video # [id number] Repeat 10 Times   Hold 1 Second   Complete 3 Sets   Perform 2 Times a Day          Knee heel prop    Sit on a flat surface and prop your heel up, allowing your knee to straighten out. Hold 5 Minutes   Complete 1 Set   Perform 2 Times a Day

## 2024-12-18 ENCOUNTER — CLINICAL SUPPORT (OUTPATIENT)
Facility: HOSPITAL | Age: 59
End: 2024-12-18
Payer: MEDICARE

## 2024-12-18 DIAGNOSIS — Z47.1 AFTERCARE FOLLOWING RIGHT KNEE JOINT REPLACEMENT SURGERY: ICD-10-CM

## 2024-12-18 DIAGNOSIS — Z96.651 PRESENCE OF RIGHT ARTIFICIAL KNEE JOINT: Primary | ICD-10-CM

## 2024-12-18 DIAGNOSIS — Z96.651 AFTERCARE FOLLOWING RIGHT KNEE JOINT REPLACEMENT SURGERY: ICD-10-CM

## 2024-12-18 PROCEDURE — 97140 MANUAL THERAPY 1/> REGIONS: CPT

## 2024-12-18 PROCEDURE — 97110 THERAPEUTIC EXERCISES: CPT

## 2024-12-18 NOTE — PROGRESS NOTES
Physical Therapy Daily Note     Name: Yoseph HINES [facility]  Clinic Number: [id number]  Diagnosis:   Encounter Diagnoses   Name Primary?    Presence of right artificial knee joint Yes    Aftercare following right knee joint replacement surgery      Physician: Fany Hairston PA*  Precautions: fall  Visit #: 1 of 20  PTA Visit #: 0  Time In: 1000  Time Out: 1100    Subjective     Pt reports: continued complaints of (right) knee pain. Patient returns for follow up 1/7/2025. Patient hoping surgeon does not have to go back in knee for surgical intervention. Compliant with home exercise program per patient report.   Pain Scale: Yoseph rates pain on a scale of 0-10 to be 10 currently. Patient able to hold conversation with physical therapist and not in apparent distress.     Objective     Yoseph received individual therapeutic exercises, activities, neuromuscular re-education to develop strength, endurance, ROM, flexibility, posture, and core stabilization for 45 minutes including:  All BOLD exercises performed today:   Recumbent bicycle 10 minutes slow, steady revolutions to impact on range of motion  3x10 QS  3x10 SLR  3x10 SAQ  3x10 heel slides  3x10 adductor squeezes  3x10 supine clams RTB    Yoseph received the following manual therapy techniques: Joint mobilizations were applied to the: (right) knee for 10 minutes including:  Patella mobility all motions  Grade II mobilizations into (right) knee flexion/extension     The patient received the following direct contact modalities after being cleared for contraindications: none    The patient received the following supervised modalities after being cleared for contradictions: none    Written Home Exercises Provided: previously provided  Pt demo good understanding of the education provided. Yoseph demonstrated good return demonstration of activities.     Education provided re: educated for safety during ambulation, importance  of following up with referring provider  Yoseph verbalized good understanding of education provided.   No spiritual or educational barriers to learning provided    Assessment     Patient tolerated treatment well. Very tender to palpation (right) knee generalized with slight warmth but no redness observed. Patient with cues needed to complete exercises for proper muscular activation but motivated to participate. Continue with current POC. Patient ambulating with crutches due to reports of instability (right) knee.     This is a 59 y.o. male referred to outpatient physical therapy and presents with a medical diagnosis of   Encounter Diagnoses   Name Primary?    Presence of right artificial knee joint Yes    Aftercare following right knee joint replacement surgery     and demonstrates limitations as described in the problem list. Pt prognosis is Good. Pt will continue to benefit from skilled outpatient physical therapy to address the deficits listed in the problem list, provide pt/family education and to maximize pt's level of independence in the home and community environment.     Goals as follows:  Medical necessity is demonstrated by the following IMPAIRMENTS/PROBLEMS:  1. Decreased (right) knee ROM  2. Increased (right) knee pain  3. Decreased strength (right) knee  4. Decreased tolerance functional activity  5. Increased gait impairments        Pt's spiritual, cultural and educational needs considered and pt agreeable to plan of care and goals as stated below:      Anticipated Barriers for physical therapy:      Short Term GOALS: 3 weeks. Pt agrees with goals set.  1. Patient demonstrates independence with HEP.   2. Patient demonstrates postural awareness with all activities.   3. Patient reports decreased frequency, intensity and duration of pain symptoms.   4. Patient able to perform reciprocal stepping on steps.      Long Term GOALS: 6  weeks. Pt agrees with goals set.  1. Patient demonstrates improvement in  FOTO score to 62 or greater to impact functional activity tolerance.   2. Patient demonstrates improvement in (right) knee range of motion 0-130 degrees to impact functional activities.   3. Patient demonstrates ambulation with fewer gait deviations.   4. Patient demonstrates decreased girth measurement (right) knee within .5 cm of (left) knee.   5. Patient demonstrates improvement in (right) LE strength by at least 1/2 grade or greater.   6. Goals PRN.      Plan     Continue with established Plan of Care towards PT goals.  Outpatient physical therapy 3 times weekly to include: pt ed, hep, therapeutic exercises, neuromuscular re-education/ balance exercises, manual therapy and modalities prn. Cont PT for  6 weeks. Pt may be seen by PTA as part of the rehabilitation team.   Certification dates: 12/13/2024-1/24/2025    Therapist: Mary Cr, PT  12/18/2024

## 2024-12-20 ENCOUNTER — CLINICAL SUPPORT (OUTPATIENT)
Facility: HOSPITAL | Age: 59
End: 2024-12-20
Payer: MEDICARE

## 2024-12-20 DIAGNOSIS — Z96.651 PRESENCE OF RIGHT ARTIFICIAL KNEE JOINT: Primary | ICD-10-CM

## 2024-12-20 DIAGNOSIS — Z47.1 AFTERCARE FOLLOWING RIGHT KNEE JOINT REPLACEMENT SURGERY: ICD-10-CM

## 2024-12-20 DIAGNOSIS — Z96.651 AFTERCARE FOLLOWING RIGHT KNEE JOINT REPLACEMENT SURGERY: ICD-10-CM

## 2024-12-20 PROCEDURE — 97112 NEUROMUSCULAR REEDUCATION: CPT

## 2024-12-20 PROCEDURE — 97110 THERAPEUTIC EXERCISES: CPT

## 2024-12-20 PROCEDURE — 97140 MANUAL THERAPY 1/> REGIONS: CPT

## 2024-12-20 PROCEDURE — 97530 THERAPEUTIC ACTIVITIES: CPT

## 2024-12-20 NOTE — PROGRESS NOTES
Physical Therapy Daily Note     Name: Yoseph Spear  Clinic Number: [id number]  Diagnosis:   Encounter Diagnoses   Name Primary?    Presence of right artificial knee joint Yes    Aftercare following right knee joint replacement surgery      Physician: Fany Hairston PA*  Precautions: fall  Visit #: 2 of 20  PTA Visit #: 0  Time In: 1001  Time Out: 1056    Subjective     Pt reports: continued complaints of (right) knee pain. Patient ambulating with improved gait today.   Pain Scale: Yoseph did not rate pain today. Reported it hurts but better than previous visit.     Objective     Yoseph received individual therapeutic exercises, activities, neuromuscular re-education to develop strength, endurance, ROM, flexibility, posture, and core stabilization for 55 minutes including:  All BOLD exercises performed today:   Recumbent bicycle 15 minutes slow, steady revolutions to impact on range of motion  3x10 QS  3x10 SLR  3x10 SAQ  3x10 heel slides with yoga strap and end range holds  3x10 adductor squeezes (painful due to pressure medial knee)  3x10 supine clams red theraband  3x10 supine abduction    Yoseph received the following manual therapy techniques: Joint mobilizations were applied to the: (right) knee for 10 minutes including:  Patella mobility all motions  Grade II mobilizations into (right) knee flexion/extension     The patient received the following direct contact modalities after being cleared for contraindications: none    The patient received the following supervised modalities after being cleared for contradictions: none    Written Home Exercises Provided: previously provided  Pt demo good understanding of the education provided. Yoseph demonstrated good return demonstration of activities.     Education provided re: educated for safety during ambulation, importance of following up with referring provider  Yoseph verbalized good understanding of education  provided.   No spiritual or educational barriers to learning provided    Assessment     Patient tolerated treatment fair. Very tender to palpation (right) knee generalized with slight warmth but no redness observed. Patient continues to have (right) knee pain that is limiting functional activity tolerance and manual therapy techniques. Physical therapist will continue to progress with current POC.    This is a 59 y.o. male referred to outpatient physical therapy and presents with a medical diagnosis of   Encounter Diagnoses   Name Primary?    Presence of right artificial knee joint Yes    Aftercare following right knee joint replacement surgery     and demonstrates limitations as described in the problem list. Pt prognosis is Good. Pt will continue to benefit from skilled outpatient physical therapy to address the deficits listed in the problem list, provide pt/family education and to maximize pt's level of independence in the home and community environment.     Goals as follows:  Medical necessity is demonstrated by the following IMPAIRMENTS/PROBLEMS:  1. Decreased (right) knee ROM  2. Increased (right) knee pain  3. Decreased strength (right) knee  4. Decreased tolerance functional activity  5. Increased gait impairments        Pt's spiritual, cultural and educational needs considered and pt agreeable to plan of care and goals as stated below:      Anticipated Barriers for physical therapy:      Short Term GOALS: 3 weeks. Pt agrees with goals set.  1. Patient demonstrates independence with HEP.   2. Patient demonstrates postural awareness with all activities.   3. Patient reports decreased frequency, intensity and duration of pain symptoms.   4. Patient able to perform reciprocal stepping on steps.      Long Term GOALS: 6  weeks. Pt agrees with goals set.  1. Patient demonstrates improvement in FOTO score to 62 or greater to impact functional activity tolerance.   2. Patient demonstrates improvement in (right)  knee range of motion 0-130 degrees to impact functional activities.   3. Patient demonstrates ambulation with fewer gait deviations.   4. Patient demonstrates decreased girth measurement (right) knee within .5 cm of (left) knee.   5. Patient demonstrates improvement in (right) LE strength by at least 1/2 grade or greater.   6. Goals PRN.      Plan     Continue with established Plan of Care towards PT goals.  Outpatient physical therapy 3 times weekly to include: pt ed, hep, therapeutic exercises, neuromuscular re-education/ balance exercises, manual therapy and modalities prn. Cont PT for  6 weeks. Pt may be seen by PTA as part of the rehabilitation team.   Certification dates: 12/13/2024-1/24/2025    Therapist: Mary Cr, PT  12/20/2024

## 2024-12-23 ENCOUNTER — DOCUMENTATION ONLY (OUTPATIENT)
Facility: HOSPITAL | Age: 59
End: 2024-12-23

## 2024-12-23 NOTE — PROGRESS NOTES
Called patient due to missed appt today. Spoke to wife with patient in background. Patient did not sleep last night due to hurting so bad with knee. Patient said will come to his appt tomorrow.

## 2024-12-24 ENCOUNTER — CLINICAL SUPPORT (OUTPATIENT)
Facility: HOSPITAL | Age: 59
End: 2024-12-24
Payer: MEDICARE

## 2024-12-24 DIAGNOSIS — Z96.651 PRESENCE OF RIGHT ARTIFICIAL KNEE JOINT: Primary | ICD-10-CM

## 2024-12-24 DIAGNOSIS — Z47.1 AFTERCARE FOLLOWING RIGHT KNEE JOINT REPLACEMENT SURGERY: ICD-10-CM

## 2024-12-24 DIAGNOSIS — Z96.651 AFTERCARE FOLLOWING RIGHT KNEE JOINT REPLACEMENT SURGERY: ICD-10-CM

## 2024-12-24 PROCEDURE — 97110 THERAPEUTIC EXERCISES: CPT | Mod: CQ

## 2024-12-24 PROCEDURE — 97116 GAIT TRAINING THERAPY: CPT | Mod: CQ

## 2024-12-24 PROCEDURE — 97140 MANUAL THERAPY 1/> REGIONS: CPT | Mod: CQ
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
## 2024-12-30 ENCOUNTER — CLINICAL SUPPORT (OUTPATIENT)
Facility: HOSPITAL | Age: 59
End: 2024-12-30
Payer: MEDICARE

## 2024-12-30 DIAGNOSIS — Z96.651 PRESENCE OF RIGHT ARTIFICIAL KNEE JOINT: Primary | ICD-10-CM

## 2024-12-30 DIAGNOSIS — Z47.1 AFTERCARE FOLLOWING RIGHT KNEE JOINT REPLACEMENT SURGERY: ICD-10-CM

## 2024-12-30 DIAGNOSIS — Z96.651 AFTERCARE FOLLOWING RIGHT KNEE JOINT REPLACEMENT SURGERY: ICD-10-CM

## 2024-12-30 PROCEDURE — 97140 MANUAL THERAPY 1/> REGIONS: CPT | Mod: KX

## 2024-12-30 PROCEDURE — 97530 THERAPEUTIC ACTIVITIES: CPT | Mod: KX

## 2024-12-30 PROCEDURE — 97112 NEUROMUSCULAR REEDUCATION: CPT | Mod: KX
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
## 2025-01-02 ENCOUNTER — CLINICAL SUPPORT (OUTPATIENT)
Facility: HOSPITAL | Age: 60
End: 2025-01-02
Payer: MEDICARE

## 2025-01-02 DIAGNOSIS — Z96.651 PRESENCE OF RIGHT ARTIFICIAL KNEE JOINT: Primary | ICD-10-CM

## 2025-01-02 DIAGNOSIS — Z47.1 AFTERCARE FOLLOWING RIGHT KNEE JOINT REPLACEMENT SURGERY: ICD-10-CM

## 2025-01-02 DIAGNOSIS — Z96.651 AFTERCARE FOLLOWING RIGHT KNEE JOINT REPLACEMENT SURGERY: ICD-10-CM

## 2025-01-02 PROCEDURE — 97116 GAIT TRAINING THERAPY: CPT | Mod: CQ

## 2025-01-02 PROCEDURE — 97112 NEUROMUSCULAR REEDUCATION: CPT | Mod: CQ

## 2025-01-02 PROCEDURE — 97140 MANUAL THERAPY 1/> REGIONS: CPT | Mod: CQ

## 2025-01-02 PROCEDURE — 97110 THERAPEUTIC EXERCISES: CPT | Mod: CQ
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
## 2025-01-06 ENCOUNTER — DOCUMENTATION ONLY (OUTPATIENT)
Facility: HOSPITAL | Age: 60
End: 2025-01-06

## 2025-01-06 NOTE — PROGRESS NOTES
Called patient due to no call, no show and patient reported that he was not feeling well enough to come to therapy this morning. He has a cough and sinus symptoms. Patient reminded of next appt Wednesday. Patient has follow up with ortho tomorrow.

## 2025-01-07 DIAGNOSIS — Z96.651 PRESENCE OF RIGHT ARTIFICIAL KNEE JOINT: Primary | ICD-10-CM

## 2025-01-07 DIAGNOSIS — Z47.1 AFTERCARE FOLLOWING JOINT REPLACEMENT: ICD-10-CM

## 2025-01-15 ENCOUNTER — CLINICAL SUPPORT (OUTPATIENT)
Facility: HOSPITAL | Age: 60
End: 2025-01-15
Payer: MEDICARE

## 2025-01-15 DIAGNOSIS — Z96.651 AFTERCARE FOLLOWING RIGHT KNEE JOINT REPLACEMENT SURGERY: Primary | ICD-10-CM

## 2025-01-15 DIAGNOSIS — Z96.651 PRESENCE OF RIGHT ARTIFICIAL KNEE JOINT: ICD-10-CM

## 2025-01-15 DIAGNOSIS — Z47.1 AFTERCARE FOLLOWING RIGHT KNEE JOINT REPLACEMENT SURGERY: Primary | ICD-10-CM

## 2025-01-15 PROCEDURE — 97110 THERAPEUTIC EXERCISES: CPT

## 2025-01-15 PROCEDURE — 97140 MANUAL THERAPY 1/> REGIONS: CPT

## 2025-01-15 PROCEDURE — 97530 THERAPEUTIC ACTIVITIES: CPT

## 2025-01-15 PROCEDURE — 97112 NEUROMUSCULAR REEDUCATION: CPT
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
## 2025-01-29 ENCOUNTER — CLINICAL SUPPORT (OUTPATIENT)
Facility: HOSPITAL | Age: 60
End: 2025-01-29
Payer: MEDICARE

## 2025-01-29 DIAGNOSIS — Z96.651 PRESENCE OF RIGHT ARTIFICIAL KNEE JOINT: Primary | ICD-10-CM

## 2025-01-29 DIAGNOSIS — Z47.1 AFTERCARE FOLLOWING RIGHT KNEE JOINT REPLACEMENT SURGERY: ICD-10-CM

## 2025-01-29 DIAGNOSIS — Z96.651 AFTERCARE FOLLOWING RIGHT KNEE JOINT REPLACEMENT SURGERY: ICD-10-CM

## 2025-01-29 PROCEDURE — 97140 MANUAL THERAPY 1/> REGIONS: CPT

## 2025-01-29 PROCEDURE — 97110 THERAPEUTIC EXERCISES: CPT

## 2025-01-29 NOTE — PROGRESS NOTES
Outpatient Rehab    Physical Therapy Progress Note    Patient Name: Yoseph Spear  MRN: [id number]  YOB: 1965  Today's Date: 1/29/2025    Therapy Diagnosis:   Encounter Diagnoses   Name Primary?    Presence of right artificial knee joint Yes    Aftercare following right knee joint replacement surgery      Physician: Fany Hairston PA*    Physician Orders: Eval and Treat  Visit # / Visits Authorized:  7 / 20  Date of Evaluation: 12/13/2024   Insurance Authorization Period: 1/1/2025 to 3/10/2025  Plan of Care Certification:  1/15/2025-2/28/2025      Time In: 1058   Time Out: 1152  Total Time: 54   Total Billable Time: 30    Subjective      Pain     Patient reports a current pain level of 9/10.  ((R) knee but patient in no apparent distress)            Past Medical History/Physical Systems Review:   Yoseph Spear  has a past medical history of Arthritis, Depression, Dyslipidemia, GERD (gastroesophageal reflux disease), H/O hypotestosteronemia, Obstructive sleep apnea syndrome, and Polycythemia, secondary.    Yoseph Spear  has a past surgical history that includes Knee surgery; Shoulder surgery; and Back surgery.    Yoseph has a current medication list which includes the following prescription(s): albuterol, amitriptyline, butalbital-acetaminophen-caffeine -40 mg, clonazepam, ezetimibe, fluticasone propionate, hydrocodone-acetaminophen, metoclopramide hcl, nitroglycerin, pramipexole, quetiapine, testosterone cypionate, valsartan, and venlafaxine.    Review of patient's allergies indicates:  No Known Allergies     Objective      Treatment:  Therapeutic Exercise  Therapeutic Exercise Activity 1: SAQ x30  Therapeutic Exercise Activity 2: SLR x30  Therapeutic Exercise Activity 3: QS x30  Therapeutic Exercise Activity 4: AP x30  Therapeutic Exercise Activity 5: supine clams x30  Therapeutic Exercise Activity 6: bridges x30  Therapeutic Exercise Activity 7: adductor squeezes x30    Manual  Therapy  Manual Therapy Activity 1: IASTM to (R) knee anterior and posterior chains  Manual Therapy Activity 2: Mobilizations into (R) knee flexion and extension including patella mobility    Therapeutic Activity  Therapeutic Activity 1: Recumbent bike x15 minutes level 3 with complete revolutions    Gait Training  Gait Training Activity 1: Cues for proper technique including weight shifting and heel strike with gait    Patient's spiritual, cultural, and educational needs considered and patient agreeable to plan of care and goals.     Assessment & Plan   Assessment: Patient with good tolerance to activities but continues to report (R) knee pain. Patient continues to be monitored for infection in relation to (R) knee. Patient encouraged to continue with HEP especially knee flexion/extension stretches. Patient educated on decreasing compensatory techniques with gait. Patient  with continued glut weakness also affecting functional mobility. PT will continue to progress with current POC.         Plan: New POC: Outpatient physical therapy 2 times weekly to include: pt ed, hep, therapeutic exercises, neuromuscular re-education/ balance exercises, manual therapy and modalities prn. Cont PT for  6 weeks. Pt may be seen by PTA as part of the rehabilitation team.   Certification dates: 1/15/2025-2/28/2025    Goals:   Active       Physical Therapy       Goals as follows:  Medical necessity is demonstrated by the following IMPAIRMENTS/PROBLEMS:  1. Decreased (right) knee ROM  2. Increased (right) knee pain  3. Decreased strength (right) knee  4. Decreased tolerance functional activity  5. Increased gait impairments        Pt's spiritual, cultural and educational needs considered and pt agreeable to plan of care and goals as stated below:      Anticipated Barriers for physical therapy:      Short Term GOALS: 3 weeks. Pt agrees with goals set.  1. Patient demonstrates independence with HEP. Progress CB 1/15/2025  2. Patient  demonstrates postural awareness with all activities. Progress CB 1/15/2025  3. Patient reports decreased frequency, intensity and duration of pain symptoms. Progress CB 1/15/2025  4. Patient able to perform reciprocal stepping on steps. Progress CB 1/15/2025     Long Term GOALS: 6  weeks. Pt agrees with goals set.  1. Patient demonstrates improvement in FOTO score to 62 or greater to impact functional activity tolerance. Progress CB 1/15/2025  2. Patient demonstrates improvement in (right) knee range of motion 0-130 degrees to impact functional activities. Progress CB 1/15/2025  3. Patient demonstrates ambulation with fewer gait deviations. Progress CB 1/15/2025  4. Patient demonstrates decreased girth measurement (right) knee within .5 cm of (left) knee. Progress CB 1/15/2025  5. Patient demonstrates improvement in (right) LE strength by at least 1/2 grade or greater. Progress CB 1/15/2025  6. Goals PRN.         Physical Therapy Goal       Start:  01/29/25    Expected End:  02/28/25       Goals as follows:  Medical necessity is demonstrated by the following IMPAIRMENTS/PROBLEMS:  1. Decreased (right) knee ROM  2. Increased (right) knee pain  3. Decreased strength (right) knee  4. Decreased tolerance functional activity  5. Increased gait impairments        Pt's spiritual, cultural and educational needs considered and pt agreeable to plan of care and goals as stated below:      Anticipated Barriers for physical therapy:      Short Term GOALS: 3 weeks. Pt agrees with goals set.  1. Patient demonstrates independence with HEP. Progress CB 1/15/2025  2. Patient demonstrates postural awareness with all activities. Progress CB 1/15/2025  3. Patient reports decreased frequency, intensity and duration of pain symptoms. Progress CB 1/15/2025  4. Patient able to perform reciprocal stepping on steps. Progress CB 1/15/2025     Long Term GOALS: 6  weeks. Pt agrees with goals set.  1. Patient demonstrates improvement in FOTO  score to 62 or greater to impact functional activity tolerance. Progress CB 1/15/2025  2. Patient demonstrates improvement in (right) knee range of motion 0-130 degrees to impact functional activities. Progress CB 1/15/2025  3. Patient demonstrates ambulation with fewer gait deviations. Progress CB 1/15/2025  4. Patient demonstrates decreased girth measurement (right) knee within .5 cm of (left) knee. Progress CB 1/15/2025  5. Patient demonstrates improvement in (right) LE strength by at least 1/2 grade or greater. Progress CB 1/15/2025  6. Goals PRN.              Mary Cr, PT, MPT  1/29/2025

## 2025-02-05 ENCOUNTER — CLINICAL SUPPORT (OUTPATIENT)
Facility: HOSPITAL | Age: 60
End: 2025-02-05
Payer: MEDICARE

## 2025-02-05 DIAGNOSIS — Z96.651 PRESENCE OF RIGHT ARTIFICIAL KNEE JOINT: Primary | ICD-10-CM

## 2025-02-05 DIAGNOSIS — Z47.1 AFTERCARE FOLLOWING RIGHT KNEE JOINT REPLACEMENT SURGERY: ICD-10-CM

## 2025-02-05 DIAGNOSIS — Z96.651 AFTERCARE FOLLOWING RIGHT KNEE JOINT REPLACEMENT SURGERY: ICD-10-CM

## 2025-02-05 PROCEDURE — 97112 NEUROMUSCULAR REEDUCATION: CPT | Mod: CQ

## 2025-02-05 PROCEDURE — 97110 THERAPEUTIC EXERCISES: CPT | Mod: CQ

## 2025-02-05 PROCEDURE — 97116 GAIT TRAINING THERAPY: CPT | Mod: CQ

## 2025-02-05 PROCEDURE — 97140 MANUAL THERAPY 1/> REGIONS: CPT | Mod: CQ

## 2025-02-05 NOTE — PROGRESS NOTES
Outpatient Rehab    Physical Therapy Visit  Physical Therapy Daily Note    Patient Name: Yoseph Spear  MRN: [id number]  YOB: 1965  Today's Date: 2/5/2025    Therapy Diagnosis:   Encounter Diagnoses   Name Primary?    Presence of right artificial knee joint Yes    Aftercare following right knee joint replacement surgery        Physician: Fany Hairston PA*    Physician Orders: Eval and Treat  Medical Diagnosis:     Visit # / Visits Authorized:  4 / 20   Date of Evaluation:    Insurance Authorization Period:   Plan of Care Certification: 1/15/2025-2/28/2025      Time In: 1100   Time Out: 1200  Total Time: 60        Subjective  Knee doesn't feel much better since drainage last Thursday. He is awaiting the cultures. Pt LLE knee painful 6/10 (anterior and lateral). Pt has follow up with surgeon clinic today.             Objective            Treatment:    Bike 15 minutes 10 at level 3 , 5 minutes at level 5    Manual therapy -  STM/MFR - LLE knee   ROM  - knee flexion and extension (pt wearing tight jeans unable to fully range)    Therapeutic exercise  -   Bridges with modification - (limited by pain) 30 reps   Prone knee flexion - 30 reps   Side lying hip extension - 30 reps   Prone knee extension hang 4 lbs 3 minutes   Prone hip extension 4 lbs 30 reps   Standing weight shifts  Standing knee flexion and extension  5 reps   Seated LAQ 25 reps     Gait training - 25 feet   LLE hip ER, antalgic     Patient's spiritual, cultural, and educational needs considered and patient agreeable to plan of care and goals.     Assessment & Plan   Assessment:       Pt has constant pain, unable to ambulate without limping . Exercises are modified to reduce pain. Scar healing WNL. Knee flexion WFL, knee extension lacking 8-10 degrees. Education to perform knee extension at home, seated and standing as much as possible within pain free range.   Pt needs continued therapy to improve functional mobility and return to  PLOF pain free.     NOTE; pt body rejected metal in previous finger surgery (RUE).           Plan:  New POC: Outpatient physical therapy 2 times weekly to include: pt ed, hep, therapeutic exercises, neuromuscular re-education/ balance exercises, manual therapy and modalities prn. Cont PT for 6 weeks. Pt may be seen by PTA as part of the rehabilitation team.   Goals:   Active       Physical Therapy       Goals as follows:  Medical necessity is demonstrated by the following IMPAIRMENTS/PROBLEMS:  1. Decreased (right) knee ROM  2. Increased (right) knee pain  3. Decreased strength (right) knee  4. Decreased tolerance functional activity  5. Increased gait impairments        Pt's spiritual, cultural and educational needs considered and pt agreeable to plan of care and goals as stated below:      Anticipated Barriers for physical therapy:      Short Term GOALS: 3 weeks. Pt agrees with goals set.  1. Patient demonstrates independence with HEP. Progress CB 1/15/2025  2. Patient demonstrates postural awareness with all activities. Progress CB 1/15/2025  3. Patient reports decreased frequency, intensity and duration of pain symptoms. Progress CB 1/15/2025  4. Patient able to perform reciprocal stepping on steps. Progress CB 1/15/2025     Long Term GOALS: 6  weeks. Pt agrees with goals set.  1. Patient demonstrates improvement in FOTO score to 62 or greater to impact functional activity tolerance. Progress CB 1/15/2025  2. Patient demonstrates improvement in (right) knee range of motion 0-130 degrees to impact functional activities. Progress CB 1/15/2025  3. Patient demonstrates ambulation with fewer gait deviations. Progress CB 1/15/2025  4. Patient demonstrates decreased girth measurement (right) knee within .5 cm of (left) knee. Progress CB 1/15/2025  5. Patient demonstrates improvement in (right) LE strength by at least 1/2 grade or greater. Progress CB 1/15/2025  6. Goals PRN.         Physical Therapy Goal (Progressing)        Start:  01/29/25    Expected End:  02/28/25       Goals as follows:  Medical necessity is demonstrated by the following IMPAIRMENTS/PROBLEMS:  1. Decreased (right) knee ROM  2. Increased (right) knee pain  3. Decreased strength (right) knee  4. Decreased tolerance functional activity  5. Increased gait impairments        Pt's spiritual, cultural and educational needs considered and pt agreeable to plan of care and goals as stated below:      Anticipated Barriers for physical therapy:      Short Term GOALS: 3 weeks. Pt agrees with goals set.  1. Patient demonstrates independence with HEP. Progress CB 1/15/2025  2. Patient demonstrates postural awareness with all activities. Progress CB 1/15/2025  3. Patient reports decreased frequency, intensity and duration of pain symptoms. Progress CB 1/15/2025  4. Patient able to perform reciprocal stepping on steps. Progress CB 1/15/2025     Long Term GOALS: 6  weeks. Pt agrees with goals set.  1. Patient demonstrates improvement in FOTO score to 62 or greater to impact functional activity tolerance. Progress CB 1/15/2025  2. Patient demonstrates improvement in (right) knee range of motion 0-130 degrees to impact functional activities. Progress CB 1/15/2025  3. Patient demonstrates ambulation with fewer gait deviations. Progress CB 1/15/2025  4. Patient demonstrates decreased girth measurement (right) knee within .5 cm of (left) knee. Progress CB 1/15/2025  5. Patient demonstrates improvement in (right) LE strength by at least 1/2 grade or greater. Progress CB 1/15/2025  6. Goals PRN.                Mariann Reese, PTA, CI, MS, MFDc  2/5/2025

## 2025-02-19 ENCOUNTER — DOCUMENTATION ONLY (OUTPATIENT)
Facility: HOSPITAL | Age: 60
End: 2025-02-19
Payer: MEDICARE

## 2025-02-19 NOTE — PROGRESS NOTES
Physical therapist left message for patient to return call to discuss plan going forward with physical therapy.

## 2025-02-25 ENCOUNTER — DOCUMENTATION ONLY (OUTPATIENT)
Facility: HOSPITAL | Age: 60
End: 2025-02-25
Payer: MEDICARE

## 2025-02-25 NOTE — PROGRESS NOTES
PHYSICAL THERAPY DISCHARGE:    This patient was originally evaluated at our facility on: 12/13/2025         Pt attended PT for a total of 8 Visits receiving: Manual Therapy, Therex, Postural Education, Body Mechanics Training, Home Exercise Instruction     This patient's last visit occurred on: 2/5/2025    Outpatient Rehab     Physical Therapy Visit  Physical Therapy Daily Note     Patient Name: Yoseph Spear  MRN: [id number]  YOB: 1965  Today's Date: 2/5/2025     Therapy Diagnosis:        Encounter Diagnoses   Name Primary?    Presence of right artificial knee joint Yes    Aftercare following right knee joint replacement surgery           Physician: Fany Hairston PA*     Physician Orders: Eval and Treat  Medical Diagnosis:      Visit # / Visits Authorized:  4 / 20   Date of Evaluation:    Insurance Authorization Period:   Plan of Care Certification: 1/15/2025-2/28/2025                 Time In: 1100   Time Out: 1200  Total Time: 60      Subjective  Knee doesn't feel much better since drainage last Thursday. He is awaiting the cultures. Pt LLE knee painful 6/10 (anterior and lateral). Pt has follow up with surgeon clinic today.           Objective         Treatment:     Bike 15 minutes 10 at level 3 , 5 minutes at level 5     Manual therapy -  STM/MFR - LLE knee   ROM  - knee flexion and extension (pt wearing tight jeans unable to fully range)     Therapeutic exercise  -   Bridges with modification - (limited by pain) 30 reps   Prone knee flexion - 30 reps   Side lying hip extension - 30 reps   Prone knee extension hang 4 lbs 3 minutes   Prone hip extension 4 lbs 30 reps   Standing weight shifts  Standing knee flexion and extension  5 reps   Seated LAQ 25 reps      Gait training - 25 feet   LLE hip ER, antalgic      Patient's spiritual, cultural, and educational needs considered and patient agreeable to plan of care and goals.      Assessment & Plan  Assessment:    Pt has constant pain,  unable to ambulate without limping . Exercises are modified to reduce pain. Scar healing WNL. Knee flexion WFL, knee extension lacking 8-10 degrees. Education to perform knee extension at home, seated and standing as much as possible within pain free range.   Pt needs continued therapy to improve functional mobility and return to PLOF pain free.      NOTE; pt body rejected metal in previous finger surgery (RUE).             Plan:  New POC: Outpatient physical therapy 2 times weekly to include: pt ed, hep, therapeutic exercises, neuromuscular re-education/ balance exercises, manual therapy and modalities prn. Cont PT for 6 weeks. Pt may be seen by PTA as part of the rehabilitation team.   Goals:   Active         Physical Therapy         Goals as follows:  Medical necessity is demonstrated by the following IMPAIRMENTS/PROBLEMS:  1. Decreased (right) knee ROM  2. Increased (right) knee pain  3. Decreased strength (right) knee  4. Decreased tolerance functional activity  5. Increased gait impairments        Pt's spiritual, cultural and educational needs considered and pt agreeable to plan of care and goals as stated below:      Anticipated Barriers for physical therapy:      Short Term GOALS: 3 weeks. Pt agrees with goals set.  1. Patient demonstrates independence with HEP. Progress CB 1/15/2025  2. Patient demonstrates postural awareness with all activities. Progress CB 1/15/2025  3. Patient reports decreased frequency, intensity and duration of pain symptoms. Progress CB 1/15/2025  4. Patient able to perform reciprocal stepping on steps. Progress CB 1/15/2025     Long Term GOALS: 6  weeks. Pt agrees with goals set.  1. Patient demonstrates improvement in FOTO score to 62 or greater to impact functional activity tolerance. Progress CB 1/15/2025  2. Patient demonstrates improvement in (right) knee range of motion 0-130 degrees to impact functional activities. Progress CB 1/15/2025  3. Patient demonstrates ambulation  with fewer gait deviations. Progress CB 1/15/2025  4. Patient demonstrates decreased girth measurement (right) knee within .5 cm of (left) knee. Progress CB 1/15/2025  5. Patient demonstrates improvement in (right) LE strength by at least 1/2 grade or greater. Progress CB 1/15/2025  6. Goals PRN.            Physical Therapy Goal (Progressing)         Start:  01/29/25    Expected End:  02/28/25        Goals as follows:  Medical necessity is demonstrated by the following IMPAIRMENTS/PROBLEMS:  1. Decreased (right) knee ROM  2. Increased (right) knee pain  3. Decreased strength (right) knee  4. Decreased tolerance functional activity  5. Increased gait impairments        Pt's spiritual, cultural and educational needs considered and pt agreeable to plan of care and goals as stated below:      Anticipated Barriers for physical therapy:      Short Term GOALS: 3 weeks. Pt agrees with goals set.  1. Patient demonstrates independence with HEP. Progress CB 1/15/2025  2. Patient demonstrates postural awareness with all activities. Progress CB 1/15/2025  3. Patient reports decreased frequency, intensity and duration of pain symptoms. Progress CB 1/15/2025  4. Patient able to perform reciprocal stepping on steps. Progress CB 1/15/2025     Long Term GOALS: 6  weeks. Pt agrees with goals set.  1. Patient demonstrates improvement in FOTO score to 62 or greater to impact functional activity tolerance. Progress CB 1/15/2025  2. Patient demonstrates improvement in (right) knee range of motion 0-130 degrees to impact functional activities. Progress CB 1/15/2025  3. Patient demonstrates ambulation with fewer gait deviations. Progress CB 1/15/2025  4. Patient demonstrates decreased girth measurement (right) knee within .5 cm of (left) knee. Progress CB 1/15/2025  5. Patient demonstrates improvement in (right) LE strength by at least 1/2 grade or greater. Progress CB 1/15/2025  6. Goals PRN.                   Mariann Reese, PTA, CI, MS,  [facility] [place]       Pt was DC'd from our care secondary to:   Left message for patient to return call. Physical therapist previously received message from clinic staff that patient had received word that he had a fracture in the area of knee replacement. Physical therapist has tried to get in touch with patient to discuss this finding and find out what the discussed plan going forward with ortho was. Patient will be taken off of physical therapist schedule at this time and discharged from physical therapist intervention as patient has had apparent change in status and new orders will be needed for patient to continue with physical therapist intervention.        Therapist: Mary Cr, PT  2/25/2025